# Patient Record
(demographics unavailable — no encounter records)

---

## 2025-06-23 NOTE — HISTORY OF PRESENT ILLNESS
[FreeTextEntry1] : pt comes for annual exam . She has urge incontinence . She also has loss with cough but rare . Had mammogram .

## 2025-07-17 NOTE — PHYSICAL EXAM
[Normal] : uterus [No Bleeding] : there was no active vaginal bleeding [Uterine Adnexae] : were not tender and not enlarged [FreeTextEntry4] : anterior wall tnderness .

## 2025-07-17 NOTE — REVIEW OF SYSTEMS
[Dysuria] : dysuria [Pelvic Pain] : pelvic pain [Frequency] : frequency [Urgency] : urgency [Nl] : Integumentary [Incontinence] : no incontinence [Abn Vag Bleeding] : no abnormal vaginal bleeding [Urethral Discharge] : no abnormal urethral discharge